# Patient Record
Sex: FEMALE | Race: OTHER | HISPANIC OR LATINO | URBAN - NONMETROPOLITAN AREA
[De-identification: names, ages, dates, MRNs, and addresses within clinical notes are randomized per-mention and may not be internally consistent; named-entity substitution may affect disease eponyms.]

---

## 2023-09-21 ENCOUNTER — OFFICE VISIT (OUTPATIENT)
Dept: URGENT CARE | Facility: CLINIC | Age: 38
End: 2023-09-21
Payer: OTHER GOVERNMENT

## 2023-09-21 VITALS
TEMPERATURE: 98 F | RESPIRATION RATE: 18 BRPM | HEIGHT: 60 IN | WEIGHT: 164 LBS | SYSTOLIC BLOOD PRESSURE: 124 MMHG | DIASTOLIC BLOOD PRESSURE: 76 MMHG | OXYGEN SATURATION: 96 % | HEART RATE: 94 BPM | BODY MASS INDEX: 32.2 KG/M2

## 2023-09-21 DIAGNOSIS — S61.219A LACERATION OF SKIN OF FINGER, INITIAL ENCOUNTER: ICD-10-CM

## 2023-09-21 PROCEDURE — 12001 RPR S/N/AX/GEN/TRNK 2.5CM/<: CPT | Mod: F4 | Performed by: PHYSICIAN ASSISTANT

## 2023-09-21 PROCEDURE — 3074F SYST BP LT 130 MM HG: CPT | Performed by: PHYSICIAN ASSISTANT

## 2023-09-21 PROCEDURE — 3078F DIAST BP <80 MM HG: CPT | Performed by: PHYSICIAN ASSISTANT

## 2023-09-21 ASSESSMENT — ENCOUNTER SYMPTOMS
NEUROLOGICAL NEGATIVE: 1
MUSCULOSKELETAL NEGATIVE: 1

## 2023-09-22 NOTE — PROGRESS NOTES
Subjective:     Victorina Hammonds  is a 37 y.o. female who presents for Laceration (L hand, pinky x yesterday evening, pt states cutting an onion and sliced finger with knife )       She presents today after suffering a laceration on the tip of the left pinky finger that occurred 22 hours ago when chopping onions at home.  States that the knife did cut her finger and it did bleed but she has been keeping it covered with a dressing.  At the time of visit the bleeding has stopped.  She denies numbness or tingling over the tip of the finger.  Laceration did impact the nail on the pinky finger.  No loss of finger movement or strength.       Review of Systems   Musculoskeletal: Negative.    Skin:         Laceration on the tip of the left pinky finger   Neurological: Negative.       Allergies   Allergen Reactions    Peanut-Derived Shortness of Breath and Vomiting    Sulfa Drugs Hives, Itching, Nausea and Rash     History reviewed. No pertinent past medical history.     Objective:   /76   Pulse 94   Temp 36.7 °C (98 °F) (Temporal)   Resp 18   Ht 1.524 m (5')   Wt 74.4 kg (164 lb)   SpO2 96%   BMI 32.03 kg/m²   Physical Exam  Vitals and nursing note reviewed.   Constitutional:       General: She is not in acute distress.     Appearance: She is not ill-appearing or toxic-appearing.   HENT:      Head: Normocephalic.      Nose: No rhinorrhea.   Eyes:      General: No scleral icterus.     Conjunctiva/sclera: Conjunctivae normal.   Pulmonary:      Effort: Pulmonary effort is normal. No respiratory distress.      Breath sounds: No stridor.   Musculoskeletal:        Hands:       Cervical back: Neck supple.      Comments: Examination of the left finger does reveal a laceration present over the tip of the finger over the above marked region, the laceration does impact the nail but the nail is still attached.  Laceration does extend through the dermal layers into the subcutaneous tissue and is 1 cm in size.  No active  bleeding or drainage on exam today.  Patient did have a dressing over the finger throughout the day today and the skin present on the digit that was covered with a dressing is white in color and macerated.  No surrounding erythema, no bleeding or purulent drainage.   Neurological:      Mental Status: She is alert and oriented to person, place, and time.   Psychiatric:         Mood and Affect: Mood normal.         Behavior: Behavior normal.         Thought Content: Thought content normal.         Judgment: Judgment normal.             Diagnostic testing: None    Assessment/Plan:     Encounter Diagnoses   Name Primary?    Laceration of skin of finger, initial encounter      Procedure: Laceration Repair of left pinky finger  -Risks including bleeding, nerve damage, infection, and poor cosmetic outcome discussed. Benefits and alternatives discussed.   -Clean technique with sterile instruments and suture used  -Closed with Dermabond glue with good wound approximation  -Patient tolerated well       Plan for care for today's complaint includes placing Dermabond glue on the left pinky finger for laceration repair.  Discussed with the patient to withhold from using a dressing over the finger as the surrounding skin has been macerated due to moisture underneath the dressing that she has been wearing throughout the day today.  Discussed signs and symptoms of infection with the patient today, no evidence of acute infection at this time..    See AVS Instructions below for written guidance provided to patient on after-visit management and care in addition to our verbal discussion during the visit.    Please note that this dictation was created using voice recognition software. I have attempted to correct all errors, but there may be sound-alike, spelling, grammar and possibly content errors that I did not discover before finalizing the note.    Trevor Mckeon PA-C